# Patient Record
Sex: MALE | Race: AMERICAN INDIAN OR ALASKA NATIVE
[De-identification: names, ages, dates, MRNs, and addresses within clinical notes are randomized per-mention and may not be internally consistent; named-entity substitution may affect disease eponyms.]

---

## 2020-03-14 ENCOUNTER — HOSPITAL ENCOUNTER (EMERGENCY)
Dept: HOSPITAL 5 - ED | Age: 25
Discharge: HOME | End: 2020-03-14
Payer: COMMERCIAL

## 2020-03-14 VITALS — DIASTOLIC BLOOD PRESSURE: 110 MMHG | SYSTOLIC BLOOD PRESSURE: 153 MMHG

## 2020-03-14 DIAGNOSIS — S63.621A: Primary | ICD-10-CM
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
## 2020-03-14 DIAGNOSIS — E66.9: ICD-10-CM
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
## 2020-03-14 DIAGNOSIS — E11.9: ICD-10-CM

## 2020-03-14 NOTE — EMERGENCY DEPARTMENT REPORT
Upper Extremity





- HPI


Chief Complaint: Extremity Injury, Upper


Stated Complaint: RIGHT THUMB INJURY


Time Seen by Provider: 03/14/20 11:32


Upper Extremity: Right Hand, Right Thumb


Occurred When: 1 Day


Severity: mild


Symptoms: Yes Pain with Movement, Yes Limited Range of Movement, Yes Swelling, 

No Deformity





ED Review of Systems


ROS: 


Stated complaint: RIGHT THUMB INJURY


Other details as noted in HPI





Comment: All other systems reviewed and negative





ED Past Medical Hx





- Past Medical History


Hx Hypertension: Yes


Hx Diabetes: Yes


Additional medical history: obesity





- Surgical History


Past Surgical History?: No





- Social History


Smoking Status: Never Smoker





Upper Extremity Exam





- Exam


General: 


Vital signs noted. No distress. Alert and acting appropriately.





Head and Torso: No HEENT Abnormality, No Neck Tenderness, No Chest/Lungs 

Abnormality, No Abdominal Tenderness, No Back Tenderness


Shoulder Exam: Yes Normal Range of Motion in Shoulder, No Shoulder Tenderness, 

No Clavicle Tenderness, No Shoulder Deformity, No AC Joint Tenderness


Arm Exam: No Arm/Humerus Tenderness, No Arm Deformity


Elbow: No Elbow Tenderness, No Normal Range of Motion in Elbow, No Elbow 

Deformity


Forearm: No Forearm Tenderness, No Forearm Deformity, No Pain with Pronation, No

Pain with Supination


Wrist: Yes Normal ROM in Wrist, No Wrist Tenderness, No Wrist Deformity, No 

Snuffbox Tenderness, No Pain with Axial Thumb Compression


Hand: Yes Hand Tenderness, Yes Normal ROM in Digit(s), Yes Digit(s) Deformity 

(Swelling to the right thumb base with tenderness to palpation.  Pulses 2+ 

capillary refills are brisk.  Decreased range of motion due to pain), No Hand 

Deformity, No Digit Tenderness, No Tendon Dysfunction


CMS Exam: No Broken Skin, No Normal Distal Pulses, No Normal Capillary Refill, 

No Normal Distal Sensation





ED Course


                                   Vital Signs











  03/14/20





  10:14


 


Temperature 98.1 F


 


Pulse Rate 87


 


Respiratory 19





Rate 


 


Blood Pressure 153/110


 


O2 Sat by Pulse 100





Oximetry 














ED Medical Decision Making





- Radiology Data








 Fluoro Time In Minutes: 





 RIGHT HAND 3 VIEWS 





INDICATION / CLINICAL INFORMATION: 


SWELLING 





COMPARISON: 


None available. 





FINDINGS: 





BONES / JOINT(S): No acute fracture or subluxation. No significant arthritis. 


SOFT TISSUES: No significant abnormality. 





ADDITIONAL FINDINGS: None. 











Signer Name: Jermaine Telles MD 


Signed: 3/14/2020 10:40 AM 


Workstation Name: [email or web address] 








 Transcribed By:  


 Dictated By: Jermaine Telles MD 


 Electronically Authenticated By: Jermaine Telles MD 


 Signed Date/Time: 03/14/20 1040 











 DD/DT: 03/14/20 1040 


 TD/TT: 


Critical care attestation.: 


If time is entered above; I have spent that time in minutes in the direct care 

of this critically ill patient, excluding procedure time.








ED Disposition


Clinical Impression: 


 Jammed interphalangeal joint of finger of right hand





Disposition: DC-01 TO HOME OR SELFCARE


Is pt being admited?: No


Does the pt Need Aspirin: No


Condition: Stable


Instructions:  Jammed Finger (ED), Ice Pack Application (ED), RICE Therapy (ED)


Referrals: 


PRIMARY CARE,MD [Primary Care Provider] - 3-5 Days


ARIANNA SWANSON MD [Staff Physician] - 3-5 Days

## 2020-03-14 NOTE — XRAY REPORT
RIGHT HAND 3 VIEWS



INDICATION / CLINICAL INFORMATION:

SWELLING



COMPARISON:

None available.

 

FINDINGS:



BONES / JOINT(S): No acute fracture or subluxation. No significant arthritis.

SOFT TISSUES: No significant abnormality.



ADDITIONAL FINDINGS: None.







Signer Name: Jermaine Telles MD 

Signed: 3/14/2020 10:40 AM

Workstation Name: [email or web address]